# Patient Record
Sex: MALE | Race: WHITE | NOT HISPANIC OR LATINO | URBAN - METROPOLITAN AREA
[De-identification: names, ages, dates, MRNs, and addresses within clinical notes are randomized per-mention and may not be internally consistent; named-entity substitution may affect disease eponyms.]

---

## 2021-09-30 ENCOUNTER — APPOINTMENT (RX ONLY)
Dept: URBAN - METROPOLITAN AREA CLINIC 106 | Facility: CLINIC | Age: 61
Setting detail: DERMATOLOGY
End: 2021-09-30

## 2021-09-30 DIAGNOSIS — D18.0 HEMANGIOMA: ICD-10-CM

## 2021-09-30 DIAGNOSIS — D22 MELANOCYTIC NEVI: ICD-10-CM

## 2021-09-30 DIAGNOSIS — L81.4 OTHER MELANIN HYPERPIGMENTATION: ICD-10-CM

## 2021-09-30 DIAGNOSIS — D49.2 NEOPLASM OF UNSPECIFIED BEHAVIOR OF BONE, SOFT TISSUE, AND SKIN: ICD-10-CM | Status: STABLE

## 2021-09-30 DIAGNOSIS — L82.1 OTHER SEBORRHEIC KERATOSIS: ICD-10-CM

## 2021-09-30 PROBLEM — D18.01 HEMANGIOMA OF SKIN AND SUBCUTANEOUS TISSUE: Status: ACTIVE | Noted: 2021-09-30

## 2021-09-30 PROBLEM — D22.39 MELANOCYTIC NEVI OF OTHER PARTS OF FACE: Status: ACTIVE | Noted: 2021-09-30

## 2021-09-30 PROCEDURE — ? ADDITIONAL NOTES

## 2021-09-30 PROCEDURE — ? OBSERVATION

## 2021-09-30 PROCEDURE — ? COUNSELING

## 2021-09-30 PROCEDURE — 99203 OFFICE O/P NEW LOW 30 MIN: CPT

## 2021-09-30 ASSESSMENT — LOCATION ZONE DERM
LOCATION ZONE: NOSE
LOCATION ZONE: SCALP
LOCATION ZONE: LEG

## 2021-09-30 ASSESSMENT — LOCATION SIMPLE DESCRIPTION DERM
LOCATION SIMPLE: RIGHT THIGH
LOCATION SIMPLE: POSTERIOR SCALP
LOCATION SIMPLE: SCALP
LOCATION SIMPLE: NOSE

## 2021-09-30 ASSESSMENT — LOCATION DETAILED DESCRIPTION DERM
LOCATION DETAILED: RIGHT ANTERIOR PROXIMAL THIGH
LOCATION DETAILED: LEFT CENTRAL PARIETAL SCALP
LOCATION DETAILED: LEFT SUPERIOR OCCIPITAL SCALP
LOCATION DETAILED: RIGHT SUPERIOR POSTERIOR PARIETAL SCALP
LOCATION DETAILED: POSTERIOR MID-PARIETAL SCALP
LOCATION DETAILED: NASAL DORSUM
LOCATION DETAILED: RIGHT CENTRAL PARIETAL SCALP

## 2021-09-30 NOTE — PROCEDURE: MIPS QUALITY
Additional Notes: Patient declines taking any medications
Quality 130: Documentation Of Current Medications In The Medical Record: Documentation of a medical reason(s) for not documenting, updating, or reviewing the patientâs current medications list (e.g., patient is in an urgent or emergent medical situation)
Detail Level: Detailed

## 2021-09-30 NOTE — PROCEDURE: ADDITIONAL NOTES
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
Detail Level: Simple
Render Risk Assessment In Note?: no

## 2021-09-30 NOTE — PROCEDURE: OBSERVATION
Body Location Override (Optional - Billing Will Still Be Based On Selected Body Map Location If Applicable): left nasal sidewall
Detail Level: Detailed
Size Of Lesion In Cm (Optional): 0

## 2022-04-18 ENCOUNTER — APPOINTMENT (RX ONLY)
Dept: URBAN - METROPOLITAN AREA CLINIC 106 | Facility: CLINIC | Age: 62
Setting detail: DERMATOLOGY
End: 2022-04-18

## 2022-04-18 DIAGNOSIS — B07.8 OTHER VIRAL WARTS: ICD-10-CM

## 2022-04-18 PROCEDURE — ? CANTHARIDIN

## 2022-04-18 PROCEDURE — 17110 DESTRUCTION B9 LES UP TO 14: CPT

## 2022-04-18 PROCEDURE — ? COUNSELING

## 2022-04-18 PROCEDURE — ? BENIGN DESTRUCTION

## 2022-04-18 ASSESSMENT — LOCATION DETAILED DESCRIPTION DERM: LOCATION DETAILED: RIGHT MEDIAL PLANTAR MIDFOOT

## 2022-04-18 ASSESSMENT — LOCATION SIMPLE DESCRIPTION DERM: LOCATION SIMPLE: RIGHT PLANTAR SURFACE

## 2022-04-18 ASSESSMENT — LOCATION ZONE DERM: LOCATION ZONE: FEET

## 2022-04-18 NOTE — PROCEDURE: CANTHARIDIN
Medical Necessity Clause: This procedure was medically necessary because the lesions that were treated were:
Cantharone Forte Duration Text (Please Remove Duration From Postcare): The patient was instructed to leave the [facility] on for 6-8 hours and then wash the area well with soap and water.
Curette Before Application?: No
Detail Level: Detailed
Canthacur Duration Text (Please Remove Duration From Postcare): The patient was instructed to leave the [address] [place] [address] on for 6-8 hours and then wash the area well with soap and water.
Canthacur Ps Duration Text (Please Remove Duration From Postcare): The patient was instructed to leave the [address] [place] [address] PS on for 6-8 hours and then wash the area well with soap and water.
Curette Text: Prior to application of cantharidin the lesions were lightly pared with a curette.
Consent: The patient's consent was obtained including but not limited to risks of crusting, scabbing, scarring, blistering, darker or lighter pigmentary change, recurrence, incomplete removal and infection.
Post-Care Instructions: I reviewed with the patient in detail post-care instructions. The patient understands that the treated areas should be washed off 6 to 8 hours after application.
Medical Necessity Information: It is in your best interest to select a reason for this procedure from the list below. All of these items fulfill various CMS LCD requirements except the new and changing color options.
Strength: Chana
Chana Duration Text (Please Remove Duration From Postcare): The patient was instructed to leave the [facility] on for 6-8 hours and then wash the area well with soap and water.
Cantharone Plus Duration Text (Please Remove Duration From Postcare): The patient was instructed to leave the Cantharone Plus on for 6-8 hours and then wash the area well with soap and water.

## 2022-04-18 NOTE — PROCEDURE: BENIGN DESTRUCTION
Add 52 Modifier (Optional): no
Anesthesia Volume In Cc: 2
Detail Level: Simple
Consent: The patient's consent was obtained including but not limited to risks of crusting, scabbing, blistering, scarring, darker or lighter pigmentary change, recurrence, incomplete removal and infection.
Medical Necessity Clause: This procedure was medically necessary because the lesion being treated is irritated.
Medical Necessity Information: It is in your best interest to select a reason for this procedure from the list below. All of these items fulfill various CMS LCD requirements except the new and changing color options.
Post-Care Instructions: I reviewed with the patient in detail post-care instructions. Patient is to wear sunprotection, and avoid picking at any of the treated lesions. Pt may apply Vaseline to crusted or scabbing areas.
Treatment Number (Will Not Render If 0): 0

## 2022-04-18 NOTE — PROCEDURE: MIPS QUALITY
Quality 130: Documentation Of Current Medications In The Medical Record: Documentation of a medical reason(s) for not documenting, updating, or reviewing the patientâs current medications list (e.g., patient is in an urgent or emergent medical situation)
Additional Notes: Medications incomplete no medication list available.
Detail Level: Detailed

## 2022-05-02 ENCOUNTER — APPOINTMENT (RX ONLY)
Dept: URBAN - METROPOLITAN AREA CLINIC 106 | Facility: CLINIC | Age: 62
Setting detail: DERMATOLOGY
End: 2022-05-02

## 2022-05-02 DIAGNOSIS — B07.8 OTHER VIRAL WARTS: ICD-10-CM

## 2022-05-02 PROCEDURE — ? COUNSELING

## 2022-05-02 PROCEDURE — 17110 DESTRUCTION B9 LES UP TO 14: CPT

## 2022-05-02 PROCEDURE — ? BENIGN DESTRUCTION

## 2022-05-02 PROCEDURE — ? ADDITIONAL NOTES

## 2022-05-02 ASSESSMENT — LOCATION SIMPLE DESCRIPTION DERM
LOCATION SIMPLE: LEFT 4TH TOE
LOCATION SIMPLE: LEFT PLANTAR SURFACE

## 2022-05-02 ASSESSMENT — LOCATION DETAILED DESCRIPTION DERM
LOCATION DETAILED: LEFT MEDIAL PLANTAR MIDFOOT
LOCATION DETAILED: LEFT LATERAL 4TH TOE
LOCATION DETAILED: LEFT INSTEP

## 2022-05-02 ASSESSMENT — LOCATION ZONE DERM
LOCATION ZONE: TOE
LOCATION ZONE: FEET

## 2022-05-02 NOTE — PROCEDURE: ADDITIONAL NOTES
Additional Notes: Advised pt to seek treatment with podiatrist and orthopedic surgeon for scar tissue and pain.
Render Risk Assessment In Note?: no
Detail Level: Simple

## 2022-05-02 NOTE — PROCEDURE: MIPS QUALITY
Additional Notes: Meds incomplete, pt unaware of dosage and frequency
Quality 130: Documentation Of Current Medications In The Medical Record: Documentation of a medical reason(s) for not documenting, updating, or reviewing the patientâs current medications list (e.g., patient is in an urgent or emergent medical situation)
Detail Level: Detailed
normal patient pattern

## 2022-05-02 NOTE — PROCEDURE: BENIGN DESTRUCTION
Render Note In Bullet Format When Appropriate: No
Consent: The patient's consent was obtained including but not limited to risks of crusting, scabbing, blistering, scarring, darker or lighter pigmentary change, recurrence, incomplete removal and infection.
Treatment Number (Will Not Render If 0): 2
Medical Necessity Clause: This procedure was medically necessary because the lesion being treated is irritated.
Detail Level: Simple
Post-Care Instructions: I reviewed with the patient in detail post-care instructions. Patient is to wear sunprotection, and avoid picking at any of the treated lesions. Pt may apply Vaseline to crusted or scabbing areas.
Medical Necessity Information: It is in your best interest to select a reason for this procedure from the list below. All of these items fulfill various CMS LCD requirements except the new and changing color options.

## 2022-09-22 ENCOUNTER — APPOINTMENT (RX ONLY)
Dept: URBAN - METROPOLITAN AREA CLINIC 106 | Facility: CLINIC | Age: 62
Setting detail: DERMATOLOGY
End: 2022-09-22

## 2022-09-22 DIAGNOSIS — D22 MELANOCYTIC NEVI: ICD-10-CM

## 2022-09-22 DIAGNOSIS — D18.0 HEMANGIOMA: ICD-10-CM

## 2022-09-22 DIAGNOSIS — L72.11 PILAR CYST: ICD-10-CM

## 2022-09-22 DIAGNOSIS — L82.1 OTHER SEBORRHEIC KERATOSIS: ICD-10-CM

## 2022-09-22 DIAGNOSIS — D49.2 NEOPLASM OF UNSPECIFIED BEHAVIOR OF BONE, SOFT TISSUE, AND SKIN: ICD-10-CM

## 2022-09-22 DIAGNOSIS — L57.8 OTHER SKIN CHANGES DUE TO CHRONIC EXPOSURE TO NONIONIZING RADIATION: ICD-10-CM

## 2022-09-22 PROBLEM — D22.5 MELANOCYTIC NEVI OF TRUNK: Status: ACTIVE | Noted: 2022-09-22

## 2022-09-22 PROBLEM — D18.01 HEMANGIOMA OF SKIN AND SUBCUTANEOUS TISSUE: Status: ACTIVE | Noted: 2022-09-22

## 2022-09-22 PROCEDURE — ? BIOPSY BY SHAVE METHOD

## 2022-09-22 PROCEDURE — ? SUNSCREEN RECOMMENDATIONS

## 2022-09-22 PROCEDURE — 99213 OFFICE O/P EST LOW 20 MIN: CPT | Mod: 25

## 2022-09-22 PROCEDURE — ? COUNSELING

## 2022-09-22 PROCEDURE — 11102 TANGNTL BX SKIN SINGLE LES: CPT

## 2022-09-22 ASSESSMENT — LOCATION DETAILED DESCRIPTION DERM
LOCATION DETAILED: LEFT LATERAL ABDOMEN
LOCATION DETAILED: LEFT SUPERIOR PARIETAL SCALP
LOCATION DETAILED: LEFT RIB CAGE
LOCATION DETAILED: PERIUMBILICAL SKIN
LOCATION DETAILED: EPIGASTRIC SKIN
LOCATION DETAILED: RIGHT INFERIOR MEDIAL FOREHEAD

## 2022-09-22 ASSESSMENT — LOCATION SIMPLE DESCRIPTION DERM
LOCATION SIMPLE: SCALP
LOCATION SIMPLE: RIGHT FOREHEAD
LOCATION SIMPLE: ABDOMEN

## 2022-09-22 ASSESSMENT — LOCATION ZONE DERM
LOCATION ZONE: FACE
LOCATION ZONE: SCALP
LOCATION ZONE: TRUNK

## 2022-09-22 NOTE — PROCEDURE: BIOPSY BY SHAVE METHOD
Body Location Override (Optional - Billing Will Still Be Based On Selected Body Map Location If Applicable): left rib cage
Detail Level: Detailed
Depth Of Biopsy: dermis
Was A Bandage Applied: Yes
Size Of Lesion In Cm: 1.2
X Size Of Lesion In Cm: 0
Biopsy Type: H and E
Biopsy Method: Dermablade
Anesthesia Type: 2% lidocaine with epinephrine
Anesthesia Volume In Cc (Will Not Render If 0): 0.5
Hemostasis: Electrocautery
Wound Care: Vaseline
Dressing: pressure dressing
Destruction After The Procedure: No
Type Of Destruction Used: Electrodesiccation
Curettage Text: The wound bed was treated with curettage after the biopsy was performed.
Cryotherapy Text: The wound bed was treated with cryotherapy after the biopsy was performed.
Electrodesiccation Text: The wound bed was treated with electrodesiccation after the biopsy was performed.
Electrodesiccation And Curettage Text: The wound bed was treated with electrodesiccation and curettage after the biopsy was performed.
Silver Nitrate Text: The wound bed was treated with silver nitrate after the biopsy was performed.
Lab: [facility]
Lab Facility: [id number]
Consent: The provider's intent is to obtain a tissue sample solely for diagnostic purposes. Written consent to obtain tissue sample was obtained and risks were reviewed including but not limited to scarring, infection, bleeding, scabbing, incomplete removal, nerve damage and allergy to anesthesia.
Post-Care Instructions: i reviewed with patient in detail post care instructions and written instructions were provided? apply aquaphor or vaseline daily until healed with bandage. Patient verbalizes understanding.  Written instructions provided
Notification Instructions: Patient will be notified of biopsy results. However, patient instructed to call the office if not contacted within 2 weeks.
Billing Type: [employer]
Information: Selecting Yes will display possible errors in your note based on the variables you have selected. This validation is only offered as a suggestion for you. PLEASE NOTE THAT THE VALIDATION TEXT WILL BE REMOVED WHEN YOU FINALIZE YOUR NOTE. IF YOU WANT TO FAX A PRELIMINARY NOTE YOU WILL NEED TO TOGGLE THIS TO 'NO' IF YOU DO NOT WANT IT IN YOUR FAXED NOTE.

## 2023-03-09 ENCOUNTER — APPOINTMENT (RX ONLY)
Dept: URBAN - METROPOLITAN AREA CLINIC 106 | Facility: CLINIC | Age: 63
Setting detail: DERMATOLOGY
End: 2023-03-09

## 2023-03-09 DIAGNOSIS — D18.0 HEMANGIOMA: ICD-10-CM

## 2023-03-09 DIAGNOSIS — L57.8 OTHER SKIN CHANGES DUE TO CHRONIC EXPOSURE TO NONIONIZING RADIATION: ICD-10-CM

## 2023-03-09 DIAGNOSIS — L82.1 OTHER SEBORRHEIC KERATOSIS: ICD-10-CM

## 2023-03-09 DIAGNOSIS — T07XXXA INSECT BITE, NONVENOMOUS, OF OTHER, MULTIPLE, AND UNSPECIFIED SITES, WITHOUT MENTION OF INFECTION: ICD-10-CM

## 2023-03-09 DIAGNOSIS — D22 MELANOCYTIC NEVI: ICD-10-CM

## 2023-03-09 DIAGNOSIS — L85.3 XEROSIS CUTIS: ICD-10-CM

## 2023-03-09 PROBLEM — S80.861A INSECT BITE (NONVENOMOUS), RIGHT LOWER LEG, INITIAL ENCOUNTER: Status: ACTIVE | Noted: 2023-03-09

## 2023-03-09 PROBLEM — D18.01 HEMANGIOMA OF SKIN AND SUBCUTANEOUS TISSUE: Status: ACTIVE | Noted: 2023-03-09

## 2023-03-09 PROBLEM — S80.862A INSECT BITE (NONVENOMOUS), LEFT LOWER LEG, INITIAL ENCOUNTER: Status: ACTIVE | Noted: 2023-03-09

## 2023-03-09 PROBLEM — D22.5 MELANOCYTIC NEVI OF TRUNK: Status: ACTIVE | Noted: 2023-03-09

## 2023-03-09 PROCEDURE — ? COUNSELING

## 2023-03-09 PROCEDURE — ? PRESCRIPTION

## 2023-03-09 PROCEDURE — 99214 OFFICE O/P EST MOD 30 MIN: CPT

## 2023-03-09 PROCEDURE — ? PRESCRIPTION MEDICATION MANAGEMENT

## 2023-03-09 RX ORDER — TRIAMCINOLONE ACETONIDE 1 MG/G
1 CREAM TOPICAL BID
Qty: 80 | Refills: 1 | Status: ERX | COMMUNITY
Start: 2023-03-09

## 2023-03-09 RX ADMIN — TRIAMCINOLONE ACETONIDE 1: 1 CREAM TOPICAL at 00:00

## 2023-03-09 ASSESSMENT — LOCATION DETAILED DESCRIPTION DERM
LOCATION DETAILED: RIGHT DISTAL PRETIBIAL REGION
LOCATION DETAILED: LEFT DISTAL PRETIBIAL REGION
LOCATION DETAILED: LEFT LATERAL ABDOMEN
LOCATION DETAILED: PERIUMBILICAL SKIN
LOCATION DETAILED: RIGHT INFERIOR MEDIAL FOREHEAD
LOCATION DETAILED: EPIGASTRIC SKIN

## 2023-03-09 ASSESSMENT — LOCATION SIMPLE DESCRIPTION DERM
LOCATION SIMPLE: ABDOMEN
LOCATION SIMPLE: RIGHT FOREHEAD
LOCATION SIMPLE: LEFT PRETIBIAL REGION
LOCATION SIMPLE: RIGHT PRETIBIAL REGION

## 2023-03-09 ASSESSMENT — LOCATION ZONE DERM
LOCATION ZONE: FACE
LOCATION ZONE: TRUNK
LOCATION ZONE: LEG

## 2023-03-09 NOTE — PROCEDURE: PRESCRIPTION MEDICATION MANAGEMENT
Plan: call if wants Lela Fields- zeferino working outside now, getting frequent bug bites.  Dry and thinner skin can contribute to easily tearing when he scratches
Render In Strict Bullet Format?: No
Detail Level: Zone

## 2023-10-19 ENCOUNTER — APPOINTMENT (RX ONLY)
Dept: URBAN - METROPOLITAN AREA CLINIC 106 | Facility: CLINIC | Age: 63
Setting detail: DERMATOLOGY
End: 2023-10-19

## 2023-10-19 DIAGNOSIS — L82.1 OTHER SEBORRHEIC KERATOSIS: ICD-10-CM

## 2023-10-19 DIAGNOSIS — D22 MELANOCYTIC NEVI: ICD-10-CM

## 2023-10-19 DIAGNOSIS — L57.8 OTHER SKIN CHANGES DUE TO CHRONIC EXPOSURE TO NONIONIZING RADIATION: ICD-10-CM

## 2023-10-19 DIAGNOSIS — L63.8 OTHER ALOPECIA AREATA: ICD-10-CM

## 2023-10-19 PROBLEM — D22.5 MELANOCYTIC NEVI OF TRUNK: Status: ACTIVE | Noted: 2023-10-19

## 2023-10-19 PROCEDURE — 99214 OFFICE O/P EST MOD 30 MIN: CPT | Mod: 25

## 2023-10-19 PROCEDURE — ? PRESCRIPTION

## 2023-10-19 PROCEDURE — ? SUNSCREEN RECOMMENDATIONS

## 2023-10-19 PROCEDURE — ? INTRALESIONAL KENALOG

## 2023-10-19 PROCEDURE — ? COUNSELING

## 2023-10-19 PROCEDURE — 11900 INJECT SKIN LESIONS </W 7: CPT

## 2023-10-19 PROCEDURE — ? ADDITIONAL NOTES

## 2023-10-19 RX ORDER — CLOBETASOL PROPIONATE 0.5 MG/G
1 CREAM TOPICAL BID
Qty: 60 | Refills: 3 | Status: ERX | COMMUNITY
Start: 2023-10-19

## 2023-10-19 RX ADMIN — CLOBETASOL PROPIONATE 1: 0.5 CREAM TOPICAL at 00:00

## 2023-10-19 ASSESSMENT — LOCATION DETAILED DESCRIPTION DERM
LOCATION DETAILED: RIGHT INFERIOR MEDIAL FOREHEAD
LOCATION DETAILED: EPIGASTRIC SKIN
LOCATION DETAILED: RIGHT CENTRAL FRONTAL SCALP
LOCATION DETAILED: PERIUMBILICAL SKIN

## 2023-10-19 ASSESSMENT — LOCATION ZONE DERM
LOCATION ZONE: FACE
LOCATION ZONE: SCALP
LOCATION ZONE: TRUNK

## 2023-10-19 ASSESSMENT — LOCATION SIMPLE DESCRIPTION DERM
LOCATION SIMPLE: ABDOMEN
LOCATION SIMPLE: RIGHT FOREHEAD
LOCATION SIMPLE: SCALP

## 2023-10-19 NOTE — PROCEDURE: ADDITIONAL NOTES
Additional Notes: Patient has autoimmune thyroid disease.
Render Risk Assessment In Note?: no
Detail Level: Detailed

## 2023-10-19 NOTE — PROCEDURE: INTRALESIONAL KENALOG
Kenalog Type Of Vial: Multiple Dose
Concentration Of Solution Injected (Mg/Ml): 10.0
Require Ndc Code?: No
Administered By (Optional): Nasrin Verma PA-C
Lot # (Optional): [id number]
Total Volume Injected (Ccs- Only Use Numbers And Decimals): 0.5
X Size Of Lesion In Cm (Optional): 0
Detail Level: Zone
Show Inventory Tab: Hide
Consent: The risks of atrophy were reviewed with the patient.
Expiration Date (Optional): 1-6416
Treatment Number (Optional): 1
Ndc# For Kenalog Only: 0774-6736-26
Kenalog Preparation: Kenalog
Validate Note Data When Using Inventory: Yes
Medical Necessity Clause: This procedure was medically necessary because the lesions that were treated were:

## 2025-03-18 ENCOUNTER — APPOINTMENT (OUTPATIENT)
Dept: URBAN - METROPOLITAN AREA CLINIC 169 | Facility: CLINIC | Age: 65
Setting detail: DERMATOLOGY
End: 2025-03-18

## 2025-03-18 DIAGNOSIS — D18.0 HEMANGIOMA: ICD-10-CM

## 2025-03-18 DIAGNOSIS — Z71.89 OTHER SPECIFIED COUNSELING: ICD-10-CM

## 2025-03-18 DIAGNOSIS — T07XXXA OTHER AND UNSPECIFIED SUPERFICIAL INJURY OF OTHER, MULTIPLE, AND UNSPECIFIED SITES, WITHOUT MENTION OF INFECTION: ICD-10-CM

## 2025-03-18 DIAGNOSIS — L81.4 OTHER MELANIN HYPERPIGMENTATION: ICD-10-CM

## 2025-03-18 DIAGNOSIS — B35.3 TINEA PEDIS: ICD-10-CM

## 2025-03-18 DIAGNOSIS — L85.3 XEROSIS CUTIS: ICD-10-CM

## 2025-03-18 DIAGNOSIS — L57.8 OTHER SKIN CHANGES DUE TO CHRONIC EXPOSURE TO NONIONIZING RADIATION: ICD-10-CM

## 2025-03-18 DIAGNOSIS — L82.1 OTHER SEBORRHEIC KERATOSIS: ICD-10-CM

## 2025-03-18 DIAGNOSIS — D22 MELANOCYTIC NEVI: ICD-10-CM

## 2025-03-18 DIAGNOSIS — L72.8 OTHER FOLLICULAR CYSTS OF THE SKIN AND SUBCUTANEOUS TISSUE: ICD-10-CM | Status: STABLE

## 2025-03-18 PROBLEM — D22.71 MELANOCYTIC NEVI OF RIGHT LOWER LIMB, INCLUDING HIP: Status: ACTIVE | Noted: 2025-03-18

## 2025-03-18 PROBLEM — D18.01 HEMANGIOMA OF SKIN AND SUBCUTANEOUS TISSUE: Status: ACTIVE | Noted: 2025-03-18

## 2025-03-18 PROBLEM — S80.921A UNSPECIFIED SUPERFICIAL INJURY OF RIGHT LOWER LEG, INITIAL ENCOUNTER: Status: ACTIVE | Noted: 2025-03-18

## 2025-03-18 PROCEDURE — ? COUNSELING

## 2025-03-18 PROCEDURE — ? PHOTO-DOCUMENTATION

## 2025-03-18 PROCEDURE — ? PRESCRIPTION

## 2025-03-18 PROCEDURE — 99214 OFFICE O/P EST MOD 30 MIN: CPT

## 2025-03-18 PROCEDURE — ? ADDITIONAL NOTES

## 2025-03-18 PROCEDURE — ? OTC TREATMENT REGIMEN

## 2025-03-18 RX ORDER — CICLOPIROX OLAMINE 7.7 MG/G
1 CREAM TOPICAL BID
Qty: 90 | Refills: 3 | Status: ERX | COMMUNITY
Start: 2025-03-18

## 2025-03-18 RX ADMIN — CICLOPIROX OLAMINE 1: 7.7 CREAM TOPICAL at 00:00

## 2025-03-18 ASSESSMENT — LOCATION DETAILED DESCRIPTION DERM
LOCATION DETAILED: MID-FRONTAL SCALP
LOCATION DETAILED: LEFT SUPERIOR PARIETAL SCALP
LOCATION DETAILED: 1ST WEBSPACE RIGHT FOOT
LOCATION DETAILED: STERNUM
LOCATION DETAILED: LEFT INFERIOR MEDIAL UPPER BACK
LOCATION DETAILED: RIGHT MEDIAL UPPER BACK
LOCATION DETAILED: SUPERIOR THORACIC SPINE
LOCATION DETAILED: RIGHT PLANTAR FOREFOOT OVERLYING 1ST METATARSAL
LOCATION DETAILED: 1ST WEBSPACE LEFT FOOT
LOCATION DETAILED: LEFT MEDIAL FRONTAL SCALP
LOCATION DETAILED: RIGHT DISTAL PRETIBIAL REGION
LOCATION DETAILED: EPIGASTRIC SKIN
LOCATION DETAILED: RIGHT SUPERIOR PARIETAL SCALP
LOCATION DETAILED: INFERIOR THORACIC SPINE

## 2025-03-18 ASSESSMENT — LOCATION SIMPLE DESCRIPTION DERM
LOCATION SIMPLE: ANTERIOR SCALP
LOCATION SIMPLE: LEFT FOOT
LOCATION SIMPLE: RIGHT FOOT
LOCATION SIMPLE: RIGHT PLANTAR SURFACE
LOCATION SIMPLE: SCALP
LOCATION SIMPLE: CHEST
LOCATION SIMPLE: UPPER BACK
LOCATION SIMPLE: RIGHT PRETIBIAL REGION
LOCATION SIMPLE: RIGHT UPPER BACK
LOCATION SIMPLE: ABDOMEN
LOCATION SIMPLE: LEFT UPPER BACK
LOCATION SIMPLE: LEFT SCALP

## 2025-03-18 ASSESSMENT — LOCATION ZONE DERM
LOCATION ZONE: FEET
LOCATION ZONE: TRUNK
LOCATION ZONE: SCALP
LOCATION ZONE: LEG

## 2025-03-18 NOTE — PROCEDURE: COUNSELING
Detail Level: Zone
Sunscreen Recommendations: I recommended a broad spectrum sunscreen with a SPF of 30 or higher. I explained that SPF 30 sunscreens block approximately 97 percent of the sun's harmful rays. Sunscreens should be applied at least 15 minutes prior to expected sun exposure and then every 2 hours after that as long as sun exposure continues. If swimming or exercising sunscreen should be reapplied every 45 minutes to an hour after getting wet or sweating. One ounce, or the equivalent of a shot glass full of sunscreen, is adequate to protect the skin not covered by a bathing suit. I also recommended a lip balm with a sunscreen as well. Sun protective clothing can be used in lieu of sunscreen but must be worn the entire time you are exposed to the sun's rays.\\n\\nProducts Recommended: Eric wilson\\nElta MD
Detail Level: Generalized
Detail Level: Simple
Detail Level: Detailed

## 2025-03-18 NOTE — PROCEDURE: ADDITIONAL NOTES
Detail Level: Zone
Render Risk Assessment In Note?: no
Additional Notes: Vaseline, Aquaphor, or oil was recommend after washing and before drying off. The patient was advised to use bar soaps rather than liquid soaps and not to reuse any washcloths or loofah sponges.

## 2025-07-21 ENCOUNTER — APPOINTMENT (OUTPATIENT)
Dept: URBAN - METROPOLITAN AREA CLINIC 169 | Facility: CLINIC | Age: 65
Setting detail: DERMATOLOGY
End: 2025-07-21

## 2025-07-21 DIAGNOSIS — L84 CORNS AND CALLOSITIES: ICD-10-CM

## 2025-07-21 PROCEDURE — ? PARING HYPERKERATOTIC LESION

## 2025-07-21 PROCEDURE — ? COUNSELING

## 2025-07-21 PROCEDURE — ? ADDITIONAL NOTES

## 2025-07-21 PROCEDURE — ? PHOTO-DOCUMENTATION

## 2025-07-21 PROCEDURE — ?

## 2025-07-21 ASSESSMENT — LOCATION SIMPLE DESCRIPTION DERM: LOCATION SIMPLE: RIGHT PLANTAR SURFACE

## 2025-07-21 ASSESSMENT — LOCATION ZONE DERM: LOCATION ZONE: FEET

## 2025-07-21 ASSESSMENT — LOCATION DETAILED DESCRIPTION DERM: LOCATION DETAILED: RIGHT ARCH

## 2025-07-21 NOTE — PROCEDURE: ADDITIONAL NOTES
Detail Level: Zone
Render Risk Assessment In Note?: no
Additional Notes: History of  Plantar Fasciitis surgery years ago\\nPatient states that he produces a lot of scar tissue